# Patient Record
Sex: FEMALE | Race: WHITE | Employment: FULL TIME | URBAN - METROPOLITAN AREA
[De-identification: names, ages, dates, MRNs, and addresses within clinical notes are randomized per-mention and may not be internally consistent; named-entity substitution may affect disease eponyms.]

---

## 2020-10-02 ENCOUNTER — HOSPITAL ENCOUNTER (EMERGENCY)
Facility: CLINIC | Age: 30
Discharge: HOME | End: 2020-10-02
Attending: NURSE PRACTITIONER | Admitting: NURSE PRACTITIONER
Payer: COMMERCIAL

## 2020-10-02 VITALS
BODY MASS INDEX: 17.93 KG/M2 | TEMPERATURE: 98.1 F | OXYGEN SATURATION: 100 % | WEIGHT: 105 LBS | HEIGHT: 64 IN | DIASTOLIC BLOOD PRESSURE: 97 MMHG | HEART RATE: 100 BPM | RESPIRATION RATE: 18 BRPM | SYSTOLIC BLOOD PRESSURE: 135 MMHG

## 2020-10-02 DIAGNOSIS — N75.0 INFECTED CYST OF BARTHOLIN'S GLAND DUCT: ICD-10-CM

## 2020-10-02 DIAGNOSIS — N76.0 BACTERIAL VAGINOSIS: ICD-10-CM

## 2020-10-02 DIAGNOSIS — B96.89 BACTERIAL VAGINOSIS: ICD-10-CM

## 2020-10-02 LAB
SPECIMEN SOURCE: ABNORMAL
WET PREP SPEC: ABNORMAL

## 2020-10-02 PROCEDURE — 87491 CHLMYD TRACH DNA AMP PROBE: CPT | Performed by: NURSE PRACTITIONER

## 2020-10-02 PROCEDURE — 56420 I&D BARTHOLINS GLAND ABSCESS: CPT

## 2020-10-02 PROCEDURE — 250N000013 HC RX MED GY IP 250 OP 250 PS 637: Performed by: NURSE PRACTITIONER

## 2020-10-02 PROCEDURE — 99283 EMERGENCY DEPT VISIT LOW MDM: CPT | Mod: 25

## 2020-10-02 PROCEDURE — 87210 SMEAR WET MOUNT SALINE/INK: CPT | Performed by: NURSE PRACTITIONER

## 2020-10-02 PROCEDURE — 87591 N.GONORRHOEAE DNA AMP PROB: CPT | Performed by: NURSE PRACTITIONER

## 2020-10-02 RX ORDER — ACETAMINOPHEN 500 MG
1000 TABLET ORAL ONCE
Status: COMPLETED | OUTPATIENT
Start: 2020-10-02 | End: 2020-10-02

## 2020-10-02 RX ORDER — IBUPROFEN 600 MG/1
600 TABLET, FILM COATED ORAL ONCE
Status: COMPLETED | OUTPATIENT
Start: 2020-10-02 | End: 2020-10-02

## 2020-10-02 RX ORDER — METRONIDAZOLE 500 MG/1
500 TABLET ORAL 2 TIMES DAILY
Qty: 14 TABLET | Refills: 1 | Status: SHIPPED | OUTPATIENT
Start: 2020-10-02 | End: 2020-10-09

## 2020-10-02 RX ORDER — OXYCODONE HYDROCHLORIDE 5 MG/1
5 TABLET ORAL ONCE
Status: COMPLETED | OUTPATIENT
Start: 2020-10-02 | End: 2020-10-02

## 2020-10-02 RX ORDER — HYDROCODONE BITARTRATE AND ACETAMINOPHEN 5; 325 MG/1; MG/1
1 TABLET ORAL ONCE
Status: DISCONTINUED | OUTPATIENT
Start: 2020-10-02 | End: 2020-10-02 | Stop reason: HOSPADM

## 2020-10-02 RX ORDER — HYDROCODONE BITARTRATE AND ACETAMINOPHEN 5; 325 MG/1; MG/1
1 TABLET ORAL EVERY 6 HOURS PRN
Qty: 6 TABLET | Refills: 0 | Status: SHIPPED | OUTPATIENT
Start: 2020-10-02 | End: 2020-10-05

## 2020-10-02 RX ADMIN — ACETAMINOPHEN 1000 MG: 500 TABLET, FILM COATED ORAL at 14:27

## 2020-10-02 RX ADMIN — AMOXICILLIN AND CLAVULANATE POTASSIUM 1 TABLET: 875; 125 TABLET, FILM COATED ORAL at 15:37

## 2020-10-02 RX ADMIN — OXYCODONE HYDROCHLORIDE 5 MG: 5 TABLET ORAL at 15:45

## 2020-10-02 RX ADMIN — IBUPROFEN 600 MG: 600 TABLET ORAL at 14:27

## 2020-10-02 ASSESSMENT — ENCOUNTER SYMPTOMS
FEVER: 0
HEADACHES: 1
WOUND: 1
DYSURIA: 0
HEMATURIA: 0
COLOR CHANGE: 0
MYALGIAS: 0

## 2020-10-02 ASSESSMENT — MIFFLIN-ST. JEOR: SCORE: 1196.28

## 2020-10-02 NOTE — ED PROVIDER NOTES
History   Chief Complaint:  Wound Check    HPI   Delmar Patel is a 29 year old female, who presents to the ED for evaluation of a wound check. [facility] and was instructed to be seen in the emergency department as it was possibly an infected Bartholin cyst. Here in the emergency department, the patient states she has noticed a raised bump and swelling to her left labia for the past three days. She notes she has had some swelling to the same area in the past, but not to this extent. She has some minor discomfort around the area of swelling, but has not been able to appreciate any skin color changes or streaking. She endorses a minor headache, but was better after taking a nap. The patient is currently menstruating, but denies any vaginal discharge or pus drainage. She has not taken analgesics. She denies any fever, myalgias, vaginal discharge, hematuria, dysuria, or any other acute symptoms.    Allergies:  No known drug allergies    Medications:    The patient is not currently taking any prescribed medications.     Past Medical History:    History reviewed.  No pertinent past medical history.    Past Surgical History:    History reviewed. No pertinent surgical history.    Family History:    History reviewed. No pertinent family history.     Social History:  Presents to the ED alone  Marital Status:  Single    Review of Systems   Constitutional: Negative for fever.   Genitourinary: Negative for dysuria, hematuria, menstrual problem and vaginal discharge.   Musculoskeletal: Negative for myalgias.   Skin: Positive for wound. Negative for color change.   Neurological: Positive for headaches.   All other systems reviewed and are negative.    Physical Exam     Patient Vitals for the past 24 hrs:   BP Temp Pulse Resp SpO2 Height Weight   10/02/20 1342 (!) 135/97 -- -- -- -- -- --   10/02/20 1341 -- 98.1  F (36.7  C) 100 18 100 % 1.626 m (5'  "4\") 47.6 kg (105 lb)       Physical Exam  Nursing notes reviewed. Vitals reviewed.  General: Alert. Well kept.  Eyes:  Conjunctiva non-injected, non-icteric.  Neck/Throat: Moist mucous membranes. Normal voice.  Cardiac: Regular rhythm. Normal heart sounds.  Pulmonary: Clear and equal breath sounds bilaterally.   Abdomen: soft and non-tender. No CVA tenderness.  No suprapubic tenderness.  : Pelvic exam performed in presence of chaperone.  External genitalia with left labial swelling and induration.  Normal vaginal mucosa with clear discharge.  No CMT or adnexal tenderness.  Musculoskeletal: Normal gross range of motion of all 4 extremities.   Neurological: Alert and oriented x4.   Skin: Warm and dry. Normal appearance of visualized exposed skin without rashes or petechiae.  Psych: Affect normal. Good eye contact.    Emergency Department Course   Laboratory:    Wet Prep: clue cells (A), otherwise WNL    Chlamydia trachomatis PCR: Pending   Neisseria gonorrhoea PCR: Pending    Procedures    Incision and Drainage     LOCATIONS:  Right Labia Bartholin Cyst     ANESTHESIA:  Local field block using Lidocaine 1% plain, total of 3 mLs     PREPARATION:  Cleansed with Normal Saline and betadine     PROCEDURE:  Area was incised with # 11 Blade ([place]) with a Single Straight incision.  Wound treatment included Deloculation and Purulent Drainage.  Packing consisted of WORD catheter.  Appropriate dressing was applied to cover the area.    PATIENT STATUS:        Patient tolerated the procedure well. There were no complications.    Interventions:  1427: Tylenol 1000 mg PO  1427: Ibuprofen 600 mg PO   1537: Augmentin 875/125 mg PO  1545: Roxicodone 5 mg PO    Emergency Department Course:  Past medical records, nursing notes, and vitals reviewed.    1420 I performed an exam of the patient as documented above.     1500 I performed the incision and drainage per the above procedure note.     1600 I rechecked the patient and " discussed the results of her workup thus far.     Findings and plan explained to the Patient. Patient discharged home with instructions regarding supportive care, medications, and reasons to return. The importance of close follow-up was reviewed.     Impression & Plan   Medical Decision Making:  Exam showed area of induration and pain consistent with Bartholin abscess.  There was evidence for overlying cellulitis, so the patient was placed on Augmentin and had an I and D performed with expression of large amount of exudate. See procedure note above for details. She had significant relief from her symptoms.  Word catheter placed and instructed on Sitz baths and to f/u with primary care or gynecology in 2-3 days for wound recheck and to discuss removal of WORD catheter. DDX includes cellulitis, hematoma, lipoma, vulvar malignancy, chancroid, syphilis, herpes, but there were no signs or symptoms of these disorders. No cultures were sent for the drained fluid as this is not currently recommended.  She was instructed to keep the catheter in place until it falls out or until follow-up.  Wet prep also shows bacterial vaginosis and patient will be started on Metronidazole.  No indication for pelvic ultrasound with no pelvic or adnexal pain.  She was also told to follow up with her PMD sooner or return to ED if she develops any redness over the area, any fevers, chills or increased pain in the area.  She was discharged with analgesia and Augmentin BID for 7 days    Diagnosis:    ICD-10-CM    1. Infected cyst of Bartholin's gland duct  N75.0 Chlamydia trachomatis PCR     Neisseria gonorrhoeae PCR     Wet prep       Disposition:  Discharged to home.    Discharge Medications:  New Prescriptions    AMOXICILLIN-CLAVULANATE (AUGMENTIN) 875-125 MG TABLET    Take 1 tablet by mouth 2 times daily for 7 days    HYDROCODONE-ACETAMINOPHEN (NORCO) 5-325 MG TABLET    Take 1 tablet by mouth every 6 hours as needed for severe pain        Scribe Disclosure:  I, Charlie Dobbins, am serving as a scribe at 2:20 PM on 10/2/2020 to document services personally performed by Bella Henderson, TYLOR based on my observations and the provider's statements to me.      Bella Henderson, CNP  10/02/20 [id number]

## 2020-10-02 NOTE — ED AVS SNAPSHOT
[facility] Emergency Dept  [address] [address]  Phone: [phone]  Fax: [phone]                                    Delmar Patel   MRN: [id number]    Department: [facility] Emergency Dept   Date of Visit: 10/2/2020           After Visit Summary Signature Page    I have received my discharge instructions, and my questions have been answered. I have discussed any challenges I see with this plan with the nurse or doctor.    ..........................................................................................................................................  Patient/Patient Representative Signature      ..........................................................................................................................................  Patient Representative Print Name and Relationship to Patient    ..................................................               ................................................  Date                                   Time    ..........................................................................................................................................  Reviewed by Signature/Title    ...................................................              ..............................................  Date                                               Time          22EPIC Rev 08/18

## 2020-10-02 NOTE — LETTER
October 2, 2020      To Whom It May Concern:      Delmar Patel was seen in our Emergency Department today, 10/02/20.  I expect her condition to improve over the next 4 days.  She may return to work/school 10/7/2020  Sincerely,        Zafar Tariq RN

## 2020-10-03 LAB
C TRACH DNA SPEC QL NAA+PROBE: NEGATIVE
N GONORRHOEA DNA SPEC QL NAA+PROBE: NEGATIVE
SPECIMEN SOURCE: NORMAL
SPECIMEN SOURCE: NORMAL

## 2020-10-04 ENCOUNTER — NURSE TRIAGE (OUTPATIENT)
Dept: NURSING | Facility: CLINIC | Age: 30
End: 2020-10-04

## 2020-10-04 ENCOUNTER — MYC MEDICAL ADVICE (OUTPATIENT)
Dept: FAMILY MEDICINE | Facility: CLINIC | Age: 30
End: 2020-10-04

## 2020-10-04 NOTE — TELEPHONE ENCOUNTER
"Seen at ER on 10/2 for Bartholin abscess in L labia. This was I &D'd at ER and Word catheter was placed in the cyst to drain fluid and she was started on Augmentin x 7 days. Catheter fell out into toilet today (10 min ago) No increased pain at this time. Has Dr do tomorrow for follow-up on this issue. ER note states \"She was instructed to keep the catheter in place until it falls out or until follow-up\".      Advised see provider w/i 24 hrs; keep scheduled appt tomorrow. Advised pt call back tonight if she develops increased pain, redness, fever or any other new sx.     Additional Information    Negative: Nursing judgment    Negative: Information only call; adult is not ill or injured    Negative: Nursing judgment    Negative: Nursing judgment    Negative: Nursing judgment    Negative: Nursing judgment    Negative: Nursing judgment    Negative: Nursing judgment    Nursing judgment    Protocols used: NO GUIDELINE OR REFERENCE [id number]      "

## 2020-10-04 NOTE — TELEPHONE ENCOUNTER
Triage Call    ER yesterday for I & D of Bartholin cyst.  Started taking antibiotics. Word Catheter placed into wound and pt was instructed to do sitz baths. Has gynecology appointment on Monday for F/U  Pt concerned about slow drip of fluid from catheter placed in vagina.  Says catheter in place. Denies increased pain or increased swelling of site.   Denies fever.    Advised to use a sanitary pad to collect drainage. Monitor swelling and temperature.  Continue antibiotics and keep GYN appt.    COVID 19 Nurse Triage Plan/Patient Instructions    Please be aware that novel coronavirus (COVID-19) may be circulating in the community. If you develop symptoms such as fever, cough, or SOB or if you have concerns about the presence of another infection including coronavirus (COVID-19), please contact your health care provider or visit [email or web address].     Disposition/Instructions    Home care recommended. Follow home care protocol based instructions.    Thank you for taking steps to prevent the spread of this virus.  o Limit your contact with others.  o Wear a simple mask to cover your cough.  o Wash your hands well and often.    Resources    [facility]: About COVID-19: [email or web address]    CDC: What to Do If You're Sick: www.cdc.gov/coronavirus/2019-ncov/about/steps-when-sick.html    CDC: Ending Home Isolation: www.cdc.gov/coronavirus/2019-ncov/hcp/disposition-in-home-patients.html     CDC: Caring for Someone: www.cdc.gov/coronavirus/2019-ncov/if-you-are-sick/care-for-someone.html     [facility]: Interim Guidance for Hospital Discharge to Home: www.health.state.[place]/diseases/coronavirus/hcp/hospdischarge.pdf    [facility] clinical trials (COVID-19 research studies): clinicalaffairs.[facility].edu/um-clinical-trials     Below are the COVID-19 hotlines at the [place] Department of Health ([facility]). Interpreters are available.   o For health questions: Call 057-071-4202 or 1-713.241.9371 (7 a.m. to 7  p.m.)  o For questions about schools and childcare: Call 592-057-9730 or 1-367.955.1251 (7 a.m. to 7 p.m.)       Lesvia Pendleton RN      Additional Information    Negative: Nursing judgment    Negative: Nursing judgment    Negative: Nursing judgment    Negative: Nursing judgment    Information only question and nurse able to answer    Protocols used: NO PROTOCOL AVAILABLE - INFORMATION ONLY-A-[place]

## 2020-10-05 ENCOUNTER — OFFICE VISIT (OUTPATIENT)
Dept: OBGYN | Facility: CLINIC | Age: 30
End: 2020-10-05
Payer: COMMERCIAL

## 2020-10-05 VITALS
HEIGHT: 64 IN | WEIGHT: 102.6 LBS | BODY MASS INDEX: 17.52 KG/M2 | DIASTOLIC BLOOD PRESSURE: 80 MMHG | SYSTOLIC BLOOD PRESSURE: 122 MMHG

## 2020-10-05 DIAGNOSIS — N75.1 BARTHOLIN'S GLAND ABSCESS: Primary | ICD-10-CM

## 2020-10-05 PROCEDURE — 99202 OFFICE O/P NEW SF 15 MIN: CPT | Performed by: OBSTETRICS & GYNECOLOGY

## 2020-10-05 ASSESSMENT — MIFFLIN-ST. JEOR: SCORE: 1185.39

## 2020-10-05 NOTE — TELEPHONE ENCOUNTER
Anderson zuluaga with RN  Recommend pt see OBGYN    Called pt- discussed concern. She will see OBGYN. Cancelled appt    Jayson LANGLEY RN

## 2020-10-05 NOTE — PROGRESS NOTES
SUBJECTIVE:                                                   Delmar Patel is a 29 year old female who presents to clinic today for the following health issue(s):  Patient presents with:  Vaginal Problem: bartholin cyst, word catheter fell out      Additional information: Word catheter was placed on Friday and fell out Sunday when going to the bathroom.     HPI:  NP.  Seen on Friday for bartholin gland abscess, placed on metronidazole and augmentin, word cathter placed, fell out yesterday. Pain has improved since catheter out, but still sore. Was doing tub soaks when catheter was in place. Now just showering couple times per day.   Has some drainage.   No fevers.  Works at [employer], labor intensive [employer]. Off until Wednesday    Review of PMH, SocHx, SurHx, FHx, medications completed. Epic updated.        Patient's last menstrual period was 09/29/2020.    Patient is not currently sexually active, No obstetric history on file.  Using none for contraception.    reports that she has been smoking. She has never used smokeless tobacco.  Tobacco Cessation Action Plan: Self help information given to patient      Health maintenance updated:  no    Today's PHQ-2 Score: No flowsheet data found.  Today's PHQ-9 Score: No flowsheet data found.  Today's BERTIN-7 Score: No flowsheet data found.    Problem list and histories reviewed & adjusted, as indicated.  Additional history: as documented.    There is no problem list on file for this patient.    Past Surgical History:   Procedure Laterality Date     NO HISTORY OF SURGERY        Social History     Tobacco Use     Smoking status: Current Every Day Smoker     Smokeless tobacco: Never Used   Substance Use Topics     Alcohol use: Not on file         Negative family history of: Family History Negative            Current Outpatient Medications   Medication Sig     amoxicillin-clavulanate (AUGMENTIN) 875-125 MG tablet Take 1 tablet by mouth 2 times daily for 7 days      "HYDROcodone-acetaminophen (NORCO) 5-325 MG tablet Take 1 tablet by mouth every 6 hours as needed for severe pain     metroNIDAZOLE (FLAGYL) 500 MG tablet Take 1 tablet (500 mg) by mouth 2 times daily for 7 days     No current facility-administered medications for this visit.      No Known Allergies    ROS:  12 point review of systems negative other than symptoms noted below or in the HPI.  Genitourinary: vaginal soreness        OBJECTIVE:     /80   Ht 1.626 m (5' 4\")   Wt 46.5 kg (102 lb 9.6 oz)   LMP 09/29/2020   BMI 17.61 kg/m    Body mass index is 17.61 kg/m .    Exam:  Constitutional:  Appearance: Well nourished, well developed alert, in no acute distress  Chest:  Respiratory Effort:  Breathing unlabored. Clear to auscultation bilaterally.   Lymphatic: Lymph Nodes:  No other lymphadenopathy present  Skin: General Inspection:  No rashes present, no lesions present, no areas of discoloration.  Neurologic:  Mental Status:  Oriented X3.  Normal strength and tone, sensory exam grossly normal, mentation intact and speech normal.    Psychiatric:  Mentation appears normal and affect normal/bright.  Pelvic Exam:  External Genitalia:     Normal appearance for age, no discharge present, no tenderness present, no inflammatory lesions present, color normal. INCISION LOCATED AT INNER SURFACE LEFT BASE OF LABIA FACING INTROITUS, NO ERYTHEMA. ACCESSORY TISSUE AT SUPERIOR PORTION OF THE INCISION OF UNCERTAIN ETIOLOGY. LEFT LABIA INDURATED, SLIGHTLY ERYTHEMATOUS. NO PALPABLE FLUCTUANT MASSES. NO BARTHOLIN GLAND MASSES ON PALPATION. INCISION SITE CLOSED ENOUGH NOT TO ALLOW QTIP. LABIA TENDER.  Vagina:     Normal vaginal vault without central or paravaginal defects, no discharge present, no inflammatory lesions present, no masses present  Cervix:     Appearance healthy, no lesions present, nontender to palpation, no bleeding present  Perineum:     Perineum within normal limits, no evidence of trauma, no rashes or skin " lesions present  Anus:     Anus within normal limits, no hemorrhoids present  Inguinal Lymph Nodes:     No lymphadenopathy present  Pubic Hair:     Normal pubic hair distribution for age  Genitalia and Groin:     No rashes present, no lesions present, no areas of discoloration, no masses present         ASSESSMENT/PLAN:                                                        ICD-10-CM    1. Bartholin's gland abscess  N75.1        -Appears is improved from original presentation, no recurrence of absecess. Induration and soreness present. Cont abx. Restart tub soaks. Watch for worsening signs of absecess or fever. F/u 3-4 week. Start 800mg ibuprofen or other nsaid.   Back to work wed, call if having issues with this and can extend leave    Kasie Hadley Masters, DO  [facility] [place]

## 2020-10-30 NOTE — PROGRESS NOTES
SUBJECTIVE:                                                   Delmar Patel is a 29 year old female who presents to clinic today for the following health issue(s):  Patient presents with:  Follow Up: bartholion cyst      HPI:  Cyst resolved.  Recently had some bumps near anus, so she started acyclovir and this has seemed to help. Is out of the meds now, would like refill.     Review of PMH, SocHx, SurHx, FHx, medications completed. Epic updated.      Patient's last menstrual period was 10/26/2020 (exact date)..     Patient is sexually active, .  Using none for contraception.    reports that she has been smoking. She has never used smokeless tobacco.  Tobacco Cessation Action Plan: Information offered: Patient not interested at this time  STD testing offered?  Declined    Health maintenance updated:  no    Today's PHQ-2 Score:   PHQ-2 (  [employer]) 2020   Q1: Little interest or pleasure in doing things 0   Q2: Feeling down, depressed or hopeless 0   PHQ-2 Score 0     Today's PHQ-9 Score: No flowsheet data found.  Today's BERTIN-7 Score: No flowsheet data found.    Problem list and histories reviewed & adjusted, as indicated.  Additional history: as documented.    There is no problem list on file for this patient.    Past Surgical History:   Procedure Laterality Date     NO HISTORY OF SURGERY        Social History     Tobacco Use     Smoking status: Current Every Day Smoker     Smokeless tobacco: Never Used   Substance Use Topics     Alcohol use: Yes     Frequency: 2-4 times a month     Drinks per session: 1 or 2     Binge frequency: Never      Problem (# of Occurrences) Relation (Name,Age of Onset)    No Known Problems (7) Father, Sister, Brother, Maternal Grandmother, Maternal Grandfather, Paternal Grandmother, Other       Negative family history of: Family History Negative            Current Outpatient Medications   Medication Sig     acyclovir (ZOVIRAX) 400 MG tablet Take 1 tablet (400 mg) by mouth  every 8 hours for 5 days     No current facility-administered medications for this visit.      No Known Allergies    ROS:  12 point review of systems negative other than symptoms noted below or in the HPI.        OBJECTIVE:     /70   Pulse 60   Wt 47.2 kg (104 lb)   LMP 10/26/2020 (Exact Date)   BMI 17.85 kg/m    Body mass index is 17.85 kg/m .    Exam:  Constitutional:  Appearance: Well nourished, well developed alert, in no acute distress  Chest:  Respiratory Effort:  Breathing unlabored. Clear to auscultation bilaterally.   Lymphatic: Lymph Nodes:  No other lymphadenopathy present  Skin: General Inspection:  No rashes present, no lesions present, no areas of discoloration.  Neurologic:  Mental Status:  Oriented X3.  Normal strength and tone, sensory exam grossly normal, mentation intact and speech normal.    Psychiatric:  Mentation appears normal and affect normal/bright.  Pelvic Exam:  External Genitalia:     Normal appearance for age, no discharge present, no tenderness present, no inflammatory lesions present, diffuse erythema of labia b/l and groin down to perianal region  Urethra:   Urethral Body:  Urethra palpation normal, urethra structural support normal   Urethral Meatus:  No erythema or lesions present  Perineum:     Perineum within normal limits, no evidence of trauma, no rashes or skin lesions present  Anus:     Anus within normal limits, no hemorrhoids present  Inguinal Lymph Nodes:     No lymphadenopathy present  Pubic Hair:     Normal pubic hair distribution for age  Genitalia and Groin:     No rashes present, no lesions present, no areas of discoloration, no masses present       ASSESSMENT/PLAN:                                                        ICD-10-CM    1. Herpes simplex vulvovaginitis  A60.04 acyclovir (ZOVIRAX) 400 MG tablet   2. Bartholin's gland cyst  N75.0          -Resolution of bartholins cyst  -Refilled acyclovir for outbreaks. Discussed proper use, and when suppressive  therapy may be indicated.   Discussed changing razors/avoiding excess vulvar irritation with shaving.  Questions answered.   F/U prn    Kasie Hadley Masters, DO  [facility] [place]

## 2020-11-02 ENCOUNTER — OFFICE VISIT (OUTPATIENT)
Dept: OBGYN | Facility: CLINIC | Age: 30
End: 2020-11-02
Payer: COMMERCIAL

## 2020-11-02 VITALS
WEIGHT: 104 LBS | SYSTOLIC BLOOD PRESSURE: 100 MMHG | DIASTOLIC BLOOD PRESSURE: 70 MMHG | BODY MASS INDEX: 17.85 KG/M2 | HEART RATE: 60 BPM

## 2020-11-02 DIAGNOSIS — N75.0 BARTHOLIN'S GLAND CYST: ICD-10-CM

## 2020-11-02 DIAGNOSIS — A60.04 HERPES SIMPLEX VULVOVAGINITIS: Primary | ICD-10-CM

## 2020-11-02 PROCEDURE — 99213 OFFICE O/P EST LOW 20 MIN: CPT | Performed by: OBSTETRICS & GYNECOLOGY

## 2020-11-02 RX ORDER — ACYCLOVIR 400 MG/1
400 TABLET ORAL EVERY 8 HOURS
Qty: 15 TABLET | Refills: 0 | Status: SHIPPED | OUTPATIENT
Start: 2020-11-02 | End: 2020-11-07

## 2020-11-02 SDOH — HEALTH STABILITY: MENTAL HEALTH: HOW OFTEN DO YOU HAVE A DRINK CONTAINING ALCOHOL?: 2-4 TIMES A MONTH

## 2020-11-02 SDOH — HEALTH STABILITY: MENTAL HEALTH: HOW OFTEN DO YOU HAVE 6 OR MORE DRINKS ON ONE OCCASION?: NEVER

## 2020-11-02 SDOH — HEALTH STABILITY: MENTAL HEALTH: HOW MANY STANDARD DRINKS CONTAINING ALCOHOL DO YOU HAVE ON A TYPICAL DAY?: 1 OR 2

## 2021-01-04 ENCOUNTER — HEALTH MAINTENANCE LETTER (OUTPATIENT)
Age: 31
End: 2021-01-04

## 2021-10-11 ENCOUNTER — HEALTH MAINTENANCE LETTER (OUTPATIENT)
Age: 31
End: 2021-10-11

## 2022-01-30 ENCOUNTER — HEALTH MAINTENANCE LETTER (OUTPATIENT)
Age: 32
End: 2022-01-30

## 2022-09-24 ENCOUNTER — HEALTH MAINTENANCE LETTER (OUTPATIENT)
Age: 32
End: 2022-09-24

## 2023-05-08 ENCOUNTER — HEALTH MAINTENANCE LETTER (OUTPATIENT)
Age: 33
End: 2023-05-08

## 2025-01-13 NOTE — ED TRIAGE NOTES
Bartholin cyst, [facility] and was [facility] was on the verge of being infected. Difficulty with walking or sitting. Developed about 3 days ago.    Forms completed, signed, copy made for chart and faxed as requested.Leonila Najera,